# Patient Record
Sex: MALE | Race: WHITE
[De-identification: names, ages, dates, MRNs, and addresses within clinical notes are randomized per-mention and may not be internally consistent; named-entity substitution may affect disease eponyms.]

---

## 2020-02-19 ENCOUNTER — HOSPITAL ENCOUNTER (OUTPATIENT)
Dept: HOSPITAL 53 - M OPP | Age: 25
Discharge: HOME | End: 2020-02-19
Attending: SURGERY
Payer: COMMERCIAL

## 2020-02-19 VITALS — DIASTOLIC BLOOD PRESSURE: 76 MMHG | SYSTOLIC BLOOD PRESSURE: 125 MMHG

## 2020-02-19 VITALS — BODY MASS INDEX: 33.6 KG/M2 | WEIGHT: 290.4 LBS | HEIGHT: 78 IN

## 2020-02-19 DIAGNOSIS — K31.89: Primary | ICD-10-CM

## 2020-02-19 DIAGNOSIS — F17.210: ICD-10-CM

## 2020-02-19 DIAGNOSIS — K92.0: ICD-10-CM

## 2020-02-19 DIAGNOSIS — K21.9: ICD-10-CM

## 2020-02-19 NOTE — ROOR
________________________________________________________________________________

Patient Name: Cruz Moody           Procedure Date: 2/19/2020 7:58 AM

MRN: [id number]                          Account Number: [id number]

YOB: 1995               Age: 24

Room: [facility]                            Gender: Male

Note Status: Finalized                 

________________________________________________________________________________

 

Procedure:           Upper GI endoscopy

Indications:         Suspected esophageal reflux

Providers:           Carroll Holliday DO

Referring MD:        ROBSON LIAM DO

Requesting Provider: 

Medicines:           Propofol per Anesthesia

Complications:       No immediate complications.

________________________________________________________________________________

Procedure:           Pre-Anesthesia Assessment:

                     - Prior to the procedure, a History and Physical was 

                     performed, and patient medications and allergies were 

                     reviewed. The patient is competent. The risks and 

                     benefits of the procedure and the sedation options and 

                     risks were discussed with the patient. All questions were 

                     answered and informed consent was obtained. Patient 

                     identification and proposed procedure were verified by 

                     the physician, the nurse, the anesthesiologist and the 

                     technician in the endoscopy suite. Mental Status 

                     Examination: alert and oriented. Airway Examination: 

                     normal oropharyngeal airway and neck mobility. 

                     Respiratory Examination: clear to auscultation. CV 

                     Examination: normal. Prophylactic Antibiotics: The 

                     patient does not require prophylactic antibiotics. Prior 

                     Anticoagulants: The patient has taken no previous 

                     anticoagulant or antiplatelet agents. ASA Grade 

                     Assessment: II - A patient with mild systemic disease. 

                     After reviewing the risks and benefits, the patient was 

                     deemed in satisfactory condition to undergo the 

                     procedure. The anesthesia plan was to use monitored 

                     anesthesia care (MAC). Immediately prior to 

                     administration of medications, the patient was 

                     re-assessed for adequacy to receive sedatives. The heart 

                     rate, respiratory rate, oxygen saturations, blood 

                     pressure, adequacy of pulmonary ventilation, and response 

                     to care were monitored throughout the procedure. The 

                     physical status of the patient was re-assessed after the 

                     procedure.

                     The Endoscope was introduced through the mouth, and 

                     advanced to the third part of duodenum. The upper GI 

                     endoscopy was accomplished without difficulty. The 

                     patient tolerated the procedure well.

                                                                                

Findings:

     A few localized, less than 5 mm non-bleeding erosions were found in the 

     prepyloric region of the stomach. There were no stigmata of recent 

     bleeding. Biopsies were taken with a cold forceps for Helicobacter pylori 

     testing. Estimated blood loss was minimal.

     The exam was otherwise without abnormality.

                                                                                

Impression:          - Non-bleeding erosive gastropathy. Biopsied.

                     - The examination was otherwise normal.

Recommendation:      - Patient has a contact number available for emergencies. 

                     The signs and symptoms of potential delayed complications 

                     were discussed with the patient. Return to normal 

                     activities tomorrow. Written discharge instructions were 

                     provided to the patient.

                     - Await pathology results.

                     - Return to my office as previously scheduled.

                                                                                

 

_________________

Carroll Holliday DO

2/19/2020 8:12:09 AM

Electronically signed by Carroll Holliday DO

Number of Addenda: 0

 

Note Initiated On: 2/19/2020 7:58 AM

Estimated Blood Loss:

     Estimated blood loss was minimal.